# Patient Record
Sex: MALE | HISPANIC OR LATINO | URBAN - METROPOLITAN AREA
[De-identification: names, ages, dates, MRNs, and addresses within clinical notes are randomized per-mention and may not be internally consistent; named-entity substitution may affect disease eponyms.]

---

## 2023-03-01 ENCOUNTER — OFFICE VISIT (OUTPATIENT)
Dept: URBAN - METROPOLITAN AREA CLINIC 17 | Facility: CLINIC | Age: 73
End: 2023-03-01
Payer: OTHER MISCELLANEOUS

## 2023-03-01 DIAGNOSIS — H04.123 DRY EYE SYNDROME OF BILATERAL LACRIMAL GLANDS: ICD-10-CM

## 2023-03-01 DIAGNOSIS — E11.3292 TYPE 2 DIAB W MILD NONPRLF DIABETIC RTNOP W/O MACULAR EDEMA, LEFT EYE: ICD-10-CM

## 2023-03-01 DIAGNOSIS — E11.3311 TYPE 2 DIAB W MODERATE NONPRLF DIAB RTNOP W MACULAR EDEMA, RIGHT EYE: Primary | ICD-10-CM

## 2023-03-01 DIAGNOSIS — H25.13 AGE-RELATED NUCLEAR CATARACT, BILATERAL: ICD-10-CM

## 2023-03-01 PROCEDURE — 92004 COMPRE OPH EXAM NEW PT 1/>: CPT | Performed by: OPTOMETRIST

## 2023-03-01 ASSESSMENT — INTRAOCULAR PRESSURE
OD: 13
OS: 13

## 2023-03-01 NOTE — IMPRESSION/PLAN
Impression: Type 2 diab w mild nonprlf diabetic rtnop w/o macular edema, left eye: E11.3292.  Plan: See plan #1

## 2023-03-01 NOTE — IMPRESSION/PLAN
Impression: Type 2 diab w moderate nonprlf diab rtnop w macular edema, right eye: W94.3247. Plan: Discussed diagnosis in detail with patient. Consult recommended [[facility]]. Emphasized blood sugar control. Discussed risks of progression. Poor compliance can lead to blindness.

## 2023-04-18 ENCOUNTER — OFFICE VISIT (OUTPATIENT)
Dept: URBAN - METROPOLITAN AREA CLINIC 17 | Facility: CLINIC | Age: 73
End: 2023-04-18
Payer: OTHER MISCELLANEOUS

## 2023-04-18 DIAGNOSIS — E11.3492 TYPE 2 DIAB WITH SEVERE NONP RTNOP WITHOUT MCLR EDEMA, L EYE: ICD-10-CM

## 2023-04-18 DIAGNOSIS — E11.3411 TYPE 2 DIAB WITH SEVERE NONP RTNOP WITH MACULAR EDEMA, R EYE: Primary | ICD-10-CM

## 2023-04-18 PROCEDURE — 99204 OFFICE O/P NEW MOD 45 MIN: CPT | Performed by: OPHTHALMOLOGY

## 2023-04-18 PROCEDURE — 92134 CPTRZ OPH DX IMG PST SGM RTA: CPT | Performed by: OPHTHALMOLOGY

## 2023-04-18 ASSESSMENT — INTRAOCULAR PRESSURE
OS: 17
OD: 15

## 2023-04-18 NOTE — IMPRESSION/PLAN
Impression: Type 2 diab with severe nonp rtnop with macular edema, r eye: E11.3411. Right. Condition: unstable. Vision: vision affected. Plan: Discussed diagnosis in detail with patient. Discussed risks of progression. Based on today's exam, diagnostic studies and review of records, recommend Intravitreal Injection Treatment RIGHT EYE with AVASTIN in order to help reduce the swelling and prevent a further reduction in vision. Discussed the risks and benefits of tx. All questions answered. Patient elects to proceed with recommendation. OCT OD shows inf temp macular edema and Optos OD shows cws, scatter heme's. Patient may need additional MARY BETH tx or laser treatment in the future.

## 2023-04-18 NOTE — IMPRESSION/PLAN
Impression: Type 2 diab with severe nonp rtnop without mclr edema, l eye: O01.4185. Left. Condition: stable. Vision: vision not affected. Plan: Discussed diagnosis in detail with patient. Exam shows minimal Diabetic changes OS. No treatment is recommended at this time. Emphasized blood sugar control and advised to keep future appointments with PCP and/or Endocrinologist for the management of Diabetes. Recommend observation for now. OCT OS appears stable and Optos OS shows cws.

## 2023-05-04 ENCOUNTER — PROCEDURE (OUTPATIENT)
Dept: URBAN - METROPOLITAN AREA CLINIC 17 | Facility: CLINIC | Age: 73
End: 2023-05-04
Payer: OTHER MISCELLANEOUS

## 2023-05-04 DIAGNOSIS — E11.3411 TYPE 2 DIABETES MELLITUS WITH SEVERE NONPROLIFERATIVE DIABETIC RETINOPATHY WITH MACULAR EDEMA, RIGHT EYE: Primary | ICD-10-CM

## 2023-05-04 PROCEDURE — 67028 INJECTION EYE DRUG: CPT | Performed by: OPHTHALMOLOGY

## 2023-06-29 ENCOUNTER — OFFICE VISIT (OUTPATIENT)
Dept: URBAN - METROPOLITAN AREA CLINIC 17 | Facility: CLINIC | Age: 73
End: 2023-06-29
Payer: OTHER MISCELLANEOUS

## 2023-06-29 DIAGNOSIS — E11.3492 TYPE 2 DIAB WITH SEVERE NONP RTNOP WITHOUT MCLR EDEMA, L EYE: ICD-10-CM

## 2023-06-29 DIAGNOSIS — E11.3411 TYPE 2 DIAB WITH SEVERE NONP RTNOP WITH MACULAR EDEMA, R EYE: Primary | ICD-10-CM

## 2023-06-29 PROCEDURE — 92134 CPTRZ OPH DX IMG PST SGM RTA: CPT | Performed by: OPHTHALMOLOGY

## 2023-06-29 PROCEDURE — 99214 OFFICE O/P EST MOD 30 MIN: CPT | Performed by: OPHTHALMOLOGY

## 2023-06-29 ASSESSMENT — INTRAOCULAR PRESSURE
OS: 16
OD: 16

## 2023-06-29 NOTE — IMPRESSION/PLAN
Impression: Type 2 diab with severe nonp rtnop with macular edema, r eye: E11.3411. Right. Condition: improving. Vision: vision affected. s/p AV OD #1 05/04/23 w/ Dr. Luis Snyder: Discussed diagnosis in detail with patient. Discussed risks of progression. Recommend Macular Photocoagulation Laser treatment MAP RIGHT EYE to help reduce the swelling and prevent a further reduction in vision. Discussed risks/benefits of laser TX. All questions answered. RL1. OCT OD shows mild swelling - decrease in CMT and Optos OD shows CWS, scattered heme's. Patient understands that additional MARY BETH treatments or laser treatments may be needed in the future.

## 2023-06-29 NOTE — IMPRESSION/PLAN
Impression: Type 2 diab with severe nonp rtnop without mclr edema, l eye: F47.5287. Left. Condition: stable. Vision: vision not affected. Plan: Discussed diagnosis in detail with patient. No treatment is recommended at this time. Emphasized blood sugar control and advised to keep future appointments with PCP and/or Endocrinologist for the management of Diabetes. Recommend observation for now. OCT OS shows stable appearing and Optos OS shows CWS.

## 2023-07-26 ENCOUNTER — SURGERY (OUTPATIENT)
Dept: URBAN - METROPOLITAN AREA SURGERY 15 | Facility: SURGERY | Age: 73
End: 2023-07-26
Payer: OTHER MISCELLANEOUS

## 2023-07-26 PROCEDURE — 67210 TREATMENT OF RETINAL LESION: CPT | Performed by: OPHTHALMOLOGY

## 2023-08-02 ENCOUNTER — POST-OPERATIVE VISIT (OUTPATIENT)
Dept: URBAN - METROPOLITAN AREA CLINIC 18 | Facility: CLINIC | Age: 73
End: 2023-08-02
Payer: OTHER MISCELLANEOUS

## 2023-08-02 DIAGNOSIS — Z48.810 ENCOUNTER FOR SURGICAL AFTERCARE FOLLOWING SURGERY ON A SENSE ORGAN: Primary | ICD-10-CM

## 2023-08-02 PROCEDURE — 99024 POSTOP FOLLOW-UP VISIT: CPT | Performed by: OPTOMETRIST

## 2023-08-02 ASSESSMENT — INTRAOCULAR PRESSURE
OD: 12
OS: 17

## 2023-10-19 ENCOUNTER — OFFICE VISIT (OUTPATIENT)
Dept: URBAN - METROPOLITAN AREA CLINIC 17 | Facility: CLINIC | Age: 73
End: 2023-10-19
Payer: OTHER MISCELLANEOUS

## 2023-10-19 DIAGNOSIS — E11.3292 TYPE 2 DIAB W MILD NONPRLF DIABETIC RTNOP W/O MACULAR EDEMA, LEFT EYE: Primary | ICD-10-CM

## 2023-10-19 DIAGNOSIS — H25.13 AGE-RELATED NUCLEAR CATARACT, BILATERAL: ICD-10-CM

## 2023-10-19 DIAGNOSIS — E11.3411 TYPE 2 DIAB WITH SEVERE NONP RTNOP WITH MACULAR EDEMA, R EYE: ICD-10-CM

## 2023-10-19 PROCEDURE — 99213 OFFICE O/P EST LOW 20 MIN: CPT | Performed by: SPECIALIST

## 2023-10-19 PROCEDURE — 67028 INJECTION EYE DRUG: CPT | Performed by: SPECIALIST

## 2023-10-19 PROCEDURE — 92134 CPTRZ OPH DX IMG PST SGM RTA: CPT | Performed by: SPECIALIST

## 2023-10-19 ASSESSMENT — INTRAOCULAR PRESSURE
OD: 16
OS: 16

## 2023-11-28 ENCOUNTER — OFFICE VISIT (OUTPATIENT)
Dept: URBAN - METROPOLITAN AREA CLINIC 17 | Facility: CLINIC | Age: 73
End: 2023-11-28
Payer: OTHER MISCELLANEOUS

## 2023-11-28 DIAGNOSIS — E11.3411 TYPE 2 DIABETES MELLITUS WITH SEVERE NONPROLIFERATIVE DIABETIC RETINOPATHY WITH MACULAR EDEMA, RIGHT EYE: Primary | ICD-10-CM

## 2023-11-28 PROCEDURE — 67028 INJECTION EYE DRUG: CPT | Performed by: SPECIALIST

## 2023-11-28 ASSESSMENT — INTRAOCULAR PRESSURE
OD: 16
OS: 18

## 2024-02-14 ENCOUNTER — OFFICE VISIT (OUTPATIENT)
Dept: URBAN - METROPOLITAN AREA CLINIC 17 | Facility: CLINIC | Age: 74
End: 2024-02-14
Payer: OTHER MISCELLANEOUS

## 2024-02-14 PROCEDURE — 67028 INJECTION EYE DRUG: CPT | Performed by: SPECIALIST

## 2024-05-14 ENCOUNTER — OFFICE VISIT (OUTPATIENT)
Dept: URBAN - METROPOLITAN AREA CLINIC 17 | Facility: CLINIC | Age: 74
End: 2024-05-14
Payer: OTHER MISCELLANEOUS

## 2024-05-14 DIAGNOSIS — E11.3411 TYPE 2 DIABETES MELLITUS WITH SEVERE NONPROLIFERATIVE DIABETIC RETINOPATHY WITH MACULAR EDEMA, RIGHT EYE: Primary | ICD-10-CM

## 2024-05-14 PROCEDURE — 67028 INJECTION EYE DRUG: CPT | Performed by: SPECIALIST

## 2024-05-14 PROCEDURE — 92134 CPTRZ OPH DX IMG PST SGM RTA: CPT | Performed by: SPECIALIST

## 2024-05-14 ASSESSMENT — INTRAOCULAR PRESSURE
OD: 11
OS: 17